# Patient Record
Sex: FEMALE | Race: WHITE
[De-identification: names, ages, dates, MRNs, and addresses within clinical notes are randomized per-mention and may not be internally consistent; named-entity substitution may affect disease eponyms.]

---

## 2020-01-06 ENCOUNTER — HOSPITAL ENCOUNTER (EMERGENCY)
Dept: HOSPITAL 46 - ED | Age: 60
Discharge: HOME | End: 2020-01-06
Payer: COMMERCIAL

## 2020-01-06 VITALS — BODY MASS INDEX: 51.91 KG/M2 | HEIGHT: 63 IN | WEIGHT: 293 LBS

## 2020-01-06 DIAGNOSIS — I10: ICD-10-CM

## 2020-01-06 DIAGNOSIS — Z79.899: ICD-10-CM

## 2020-01-06 DIAGNOSIS — F17.200: ICD-10-CM

## 2020-01-06 DIAGNOSIS — K80.70: Primary | ICD-10-CM

## 2020-01-10 ENCOUNTER — HOSPITAL ENCOUNTER (OUTPATIENT)
Dept: HOSPITAL 46 - DS | Age: 60
Discharge: HOME | End: 2020-01-10
Attending: SURGERY
Payer: COMMERCIAL

## 2020-01-10 VITALS — HEIGHT: 63 IN | BODY MASS INDEX: 34.02 KG/M2 | WEIGHT: 192 LBS

## 2020-01-10 DIAGNOSIS — K21.0: ICD-10-CM

## 2020-01-10 DIAGNOSIS — F17.210: ICD-10-CM

## 2020-01-10 DIAGNOSIS — K80.12: Primary | ICD-10-CM

## 2020-01-10 DIAGNOSIS — I10: ICD-10-CM

## 2020-01-10 DIAGNOSIS — E55.9: ICD-10-CM

## 2020-01-10 DIAGNOSIS — E03.9: ICD-10-CM

## 2020-01-10 DIAGNOSIS — F32.9: ICD-10-CM

## 2020-01-10 DIAGNOSIS — Z79.899: ICD-10-CM

## 2020-01-10 DIAGNOSIS — E66.9: ICD-10-CM

## 2020-01-10 PROCEDURE — BF13YZZ FLUOROSCOPY OF GALLBLADDER AND BILE DUCTS USING OTHER CONTRAST: ICD-10-PCS | Performed by: SURGERY

## 2020-01-10 PROCEDURE — 0FT44ZZ RESECTION OF GALLBLADDER, PERCUTANEOUS ENDOSCOPIC APPROACH: ICD-10-PCS | Performed by: SURGERY

## 2020-01-10 NOTE — NUR
PATIENT'S  REQUESTED PRESCRIPTION FOR PATIENT SO HE CAN GET IT FILLED
WHILE SHE IS IN SURGERY. DR. HERMAN WROTE PRESCRIPTION. THIS WAS GIVEN TO
PATIENT'S , CASTILLO, TO GO GET FILLED.

## 2020-01-10 NOTE — NUR
01/10/20 1540 Belkys Munguia
1535 PATIENT ARRIVES TO PACU UNRESPONSIVE TO PAIN, ORAL AIRWAY IN
PLACE. RESP EVEN AND UNLABORED, MASK AT 6 LITERS.

## 2020-01-10 NOTE — NUR
PATIENT BACK IN DAY SURGERY ROOM FROM PACU. C/O PAIN 5/10 IN ABDOMEN. GIVEN
PUDDING TO EAT. TOLERATING WATER. WILL GIVE PAIN PILL ONCE SHE HAS STARTED
EATING PUDDING. ABDOMINAL DRESSINGS CDI. SCDs ON. IV SITE WNL.  AT
BEDSIDE. CALL LIGHT WITHIN REACH.

## 2020-01-11 NOTE — OR
[facility]
                                    [address], [place]  [address]
_________________________________________________________________________________________
                                                                 Signed   
 
 
DATE OF OPERATION:
01/10/2020
 
SURGEON:
Kwasi Herman MD
 
PREOPERATIVE DIAGNOSES:
Acute-on-chronic cholecystitis, cholelithiasis.
 
POSTOPERATIVE DIAGNOSES:
Acute-on-chronic cholecystitis, cholelithiasis.
 
PROCEDURE:
Laparoscopic cholecystectomy with intraoperative cholangiogram.
 
ESTIMATED BLOOD LOSS:
None.
 
FINDINGS:
Wanda had a distended, tense gallbladder with acute-on-chronic inflammatory changes and
adhesions.  The gallbladder was full of pus.  The intraoperative cholangiogram was
unremarkable.  We did have an extra nurse scrub in.  We had to add the fan retractor to
hold the stomach and the duodenum down and out of the way so we could finish our
dissection. 
 
INDICATIONS:
Wanda is a 59-year-old female, who has been having trouble for about 3 weeks with right
upper quadrant epigastric abdominal pain.  It has been radiating through to her back.
It is worse after meals within 20-30 minutes.  She went to her primary care provider and
apparently to the emergency room as well.  The ultrasound showed multiple mobile stones
in the gallbladder.  The gallbladder wall thickness was on the upper limits of normal.
She had a positive Cortez sign.  The common bile duct was unremarkable.  Her white count
was borderline at 10.8, but her liver function tests were fine.  She had been asked to
see me expeditiously from the emergency room.  In the office, I gave Wanda a brochure on
the gallbladder.  We reviewed the location and function of the gallbladder.  She
understands laparoscopic versus open cholecystectomy.  We did review the expected
intraop and postop course.  There is risk to the surgery including, but not limited to
bleeding, infection, scarring, change in contour of the skin, damage to bowel, damage to
main bile duct, incisional hernias, and other unforeseen comorbidities.  I had explained
to Wanda I thought she was a little bit sick from her gallbladder.  We did offer her
antibiotics, but she had declined.  We had scheduled her the next day for her surgery.
She had expressed understanding and wished to proceed. 
 
    Electronically Signed By: KWASI HERMAN MD  01/11/20 0641
_________________________________________________________________________________________
PATIENT NAME:     WANDA MTZ                    
MEDICAL RECORD #: [id number]            OPERATIVE REPORT              
          ACCT #: [id number]  
DATE OF BIRTH:   [birth year]            REPORT #: [id number]      
PHYSICIAN:        KWASI HERMAN MD             
PCP:              TARIQ PANIAGUA MD           
REPORT IS CONFIDENTIAL AND NOT TO BE RELEASED WITHOUT AUTHORIZATION
 
 
                                  [facility]
                                    [address] [place] [address], [place]  [address]
_________________________________________________________________________________________
                                                                 Signed   
 
 
 
PROCEDURE NOTE:
I met with Wanda and her  in our preop area.  After answering the questions, we
took Wanda into the operating room.  She was placed under general endotracheal tube
anesthesia.  She was given appropriate padding and monitoring.  Preoperative antibiotics
were given along with subcutaneous heparin.  SCDs were utilized.  All trocars were then
placed in usual positions under direct visualization of camera without difficulty.  The
findings were as above.  The gallbladder was elevated and carefully dissected free.  The
triangle of Calot was dissected free and we placed clips across the cystic artery and it
was divided.  We inserted the intraoperative cholangiocatheter into the junction of the
neck of the gallbladder with the cystic duct.  The intraoperative cholangiogram was
found to be unremarkable.  We had introduced the fan retractor and had an additional
nurse scrub in, so we could hold stomach and duodenum down and out of the way.  We then
placed PDS Endoloop over the cystic duct stump and put 2 clips on the cystic duct stump
to haider its location.  After this, the gallbladder was removed from the gallbladder
fossa with the help of cautery and placed into an EndoCatch bag.  The right upper
quadrant was irrigated and suctioned out until clear.  We then used our laparoscopic
suturing device to pass 0 Vicryl suture on either side of the fascia, the subxiphoid,
and the mid epigastric trocar sites.  This was tied down to close the fascia primarily.
After this, all the gas was allowed to escape and all the trocars were removed along
with the gallbladder.  The gallbladder had been opened on the back table and pictures
were taken for photodocumentation by our circulating nurse.  The fascia of the
supraumbilical trocar site was closed with interrupted simple and figure-of-eight 0
Vicryl sutures.  Local anesthetic was then copiously injected into all trocar sites.
Each trocar site was irrigated and suctioned out until clear.  The skin and dermis of
each trocar site were closed with interrupted 3-0 subcuticular Monocryl sutures.  Dry
gauze and tape were applied to all incisions.  Wanda was then awakened from her
anesthesia, extubated in the OR, and taken to recovery room in stable condition. 
 
 
 
            ________________________________________
            Kwasi Herman MD 
 
 
ALB/MODL
Job #:  [id number]
DD:  01/10/2020 15:38:46
DT:  01/10/2020 23:15:16
 
cc:            Kwasi Herman MD 
 
 
    Electronically Signed By: KWASI HERMAN MD  01/11/20 0641
_________________________________________________________________________________________
PATIENT NAME:     WANDA MTZ                    
MEDICAL RECORD #: [id number]            OPERATIVE REPORT              
          ACCT #: [id number]  
DATE OF BIRTH:   [birth year]            REPORT #: [id number]      
PHYSICIAN:        KWASI HERMAN MD             
PCP:              TARIQ PANIAGUA MD           
REPORT IS CONFIDENTIAL AND NOT TO BE RELEASED WITHOUT AUTHORIZATION
 
 
                                  [address] [place], [place]  [address]
_________________________________________________________________________________________
                                                                 Signed   
 
 
               Tariq Paniagua MD
 
 
Copies:  KWASI HERMAN MD, JONATHAN MD
~
 
 
 
 
 
 
 
 
 
 
 
 
 
 
 
 
 
 
 
 
 
 
 
 
 
 
 
 
 
 
 
 
 
 
 
 
 
    Electronically Signed By: KWASI HERMAN MD  01/11/20 0641
_________________________________________________________________________________________
PATIENT NAME:     WANDA MTZ                    
MEDICAL RECORD #: [id number]            OPERATIVE REPORT              
          ACCT #: [id number]  
DATE OF BIRTH:   [birth year]            REPORT #: [id number]      
PHYSICIAN:        KWASI HERMAN MD             
PCP:              TARIQ PANIAGUA MD           
REPORT IS CONFIDENTIAL AND NOT TO BE RELEASED WITHOUT AUTHORIZATION

## 2020-01-14 NOTE — PATH
[facility]
                                    [address] [facility]
                                  [place], [place]  [address]
_________________________________________________________________________________________
                                                                 Signed   
 
 
 
SPECIMEN(S): A GALLBLADDER AND STONES
 
SPECIMEN SOURCE:
A. GALLBLADDER AND STONES
 
CLINICAL HISTORY:
Cholelithiasis/cholecystitis
 
FINAL PATHOLOGIC DIAGNOSIS:
Gallbladder, cholecystectomy:
-  Xanthogranulomatous cholecystitis.
-  Cholelithiasis.
NAL:emb:C2NR
 
MICROSCOPIC EXAMINATION:
Histologic sections of all submitted blocks are examined by light microscopy.  
These findings, together with the gross examination, support the pathologic 
diagnosis. 
 
GROSS DESCRIPTION:
The specimen, labeled "SM, A and gallbladder and stones on the requisition," is 
received in formalin and consists of 
Specimen: Previously opened gallbladder.
Dimensions: 8.5 x 5 x 5 cm.
Serosa: Pink and smooth.
Cystic Duct: Unobstructed.
Calculi: Present, multiple pale brown granular irregular calculi that vary from 
0.8-1.4 cm. 
Mucosa: Pale red and velvety.
Wall thickness: 0.2-0.6 cm.
Lymph node: No pericystic lymph nodes are identified.
Additional: None.
Representative sections are submitted in cassette A1.
SS (under the direct supervision of a pathologist)
The Gross Description was prepared using a voice recognition system.  The 
report was reviewed for accuracy; however, sound-alike word errors, addition 
and/or deletions may occur.  If there is any 
question about this report, please contact Client Services.
 
PERFORMING LABORATORY:
The technical component was performed by [facility], [address], 
 
                                                                                    
_________________________________________________________________________________________
PATIENT NAME:     ANKUSH MTZ                    
MEDICAL RECORD #: [id number]            PATHOLOGY                     
          ACCT #: [id number]       ACCESSION #: [id number]     
DATE OF BIRTH:   [birth year]            REPORT #: [id number]       
PHYSICIAN:        JOSE LUIS PERDOMO              
PCP:              TARIQ MANRIQUE MD           
REPORT IS CONFIDENTIAL AND NOT TO BE RELEASED WITHOUT AUTHORIZATION
 
 
                                  [facility]
                                    [address] [place], [place]  [address]
_________________________________________________________________________________________
                                                                 Signed   
 
 
[place] [address] (Medical Director: Deepthi Westfall MD; CLIA# [id number]).  
Professional interpretation was performed by 
[facility], [address] [address] (Medical Director:  Ilia Gee MD; CLIA# 
[id number]). 
 
Diagnostician:  Annel Dooley MD
Pathologist
Electronically Signed 01/14/2020
 
 
Copies:                                
~
 
 
 
 
 
 
 
 
 
 
 
 
 
 
 
 
 
 
 
 
 
 
 
 
 
 
 
 
 
 
                                                                                    
_________________________________________________________________________________________
PATIENT NAME:     ANKUSH MTZ                    
MEDICAL RECORD #: [id number]            PATHOLOGY                     
          ACCT #: [id number]       ACCESSION #: [id number]     
DATE OF BIRTH:   [birth year]            REPORT #: [id number]       
PHYSICIAN:        JOSE LUIS PERDOMO              
PCP:              TARIQ MANRIQUE MD           
REPORT IS CONFIDENTIAL AND NOT TO BE RELEASED WITHOUT AUTHORIZATION